# Patient Record
Sex: FEMALE | Race: WHITE | NOT HISPANIC OR LATINO | Employment: FULL TIME | URBAN - METROPOLITAN AREA
[De-identification: names, ages, dates, MRNs, and addresses within clinical notes are randomized per-mention and may not be internally consistent; named-entity substitution may affect disease eponyms.]

---

## 2018-06-28 ENCOUNTER — TELEPHONE (OUTPATIENT)
Dept: OBSTETRICS AND GYNECOLOGY | Age: 33
End: 2018-06-28

## 2018-08-15 ENCOUNTER — OFFICE VISIT (OUTPATIENT)
Dept: OBSTETRICS AND GYNECOLOGY | Age: 33
End: 2018-08-15

## 2018-08-15 VITALS
BODY MASS INDEX: 17.04 KG/M2 | SYSTOLIC BLOOD PRESSURE: 122 MMHG | HEIGHT: 62 IN | DIASTOLIC BLOOD PRESSURE: 70 MMHG | WEIGHT: 92.6 LBS

## 2018-08-15 DIAGNOSIS — R10.2 PELVIC PAIN: ICD-10-CM

## 2018-08-15 DIAGNOSIS — Z11.51 ENCOUNTER FOR SCREENING FOR HUMAN PAPILLOMAVIRUS (HPV): ICD-10-CM

## 2018-08-15 DIAGNOSIS — K62.89 RECTAL PAIN: ICD-10-CM

## 2018-08-15 DIAGNOSIS — Z01.419 VISIT FOR GYNECOLOGIC EXAMINATION: Primary | ICD-10-CM

## 2018-08-15 PROCEDURE — 99385 PREV VISIT NEW AGE 18-39: CPT | Performed by: OBSTETRICS & GYNECOLOGY

## 2018-08-15 NOTE — PROGRESS NOTES
Subjective     Chief Complaint   Patient presents with   • Gynecologic Exam       History of Present Illness    Milagros Espinal is a 32 y.o.  who presents for annual exam.  Pt presents to re-establish care. She was here in past and went to fertility from here. After several years, she was not successful with carrying pregnancy to term. She did adopt 3 years ago and has a daughter.   She continued at  as she did RE treatment there but now wants to re-establish care. She had Mirena placed 12/15. She has some intermittent pelvic pain and irregular bleeding mostly after sex every couple of months. She is unsure when her last pap was done. Her last visit here was . She also has rectal pain. She was evaluated at  and IUD was checked but they advised her it was in position.  Obstetric History:  OB History      Para Term  AB Living    2       2      SAB TAB Ectopic Molar Multiple Live Births    2                   Menstrual History:     No LMP recorded (lmp unknown).         Current contraception: IUD  History of abnormal Pap smear: no  Received Gardasil immunization: no  Perform regular self breast exam: no  Family history of uterine or ovarian cancer: no  Family History of colon cancer: no  Family history of breast cancer: no    Mammogram: not indicated.  Colonoscopy: recommended  DEXA: not indicated.    Exercise: moderately active  Calcium/Vitamin D: adequate intake    The following portions of the patient's history were reviewed and updated as appropriate: allergies, current medications, past family history, past medical history, past social history, past surgical history and problem list.    Review of Systems    Review of Systems   Constitutional: Negative for fatigue.   Respiratory: Negative for shortness of breath.    Gastrointestinal: Negative for abdominal pain. Positive for occ rectal pain, no blood in stool   Genitourinary: Negative for dysuria. positive for irregular bleeding and occ  "bleeding following intercourse, positive for pelvic pain  Neurological: Negative for headaches.   Psychiatric/Behavioral: Negative for dysphoric mood.         Objective   Physical Exam    /70   Ht 157.5 cm (62\")   Wt 42 kg (92 lb 9.6 oz)   LMP  (LMP Unknown)   BMI 16.94 kg/m²     General:   alert, appears stated age, cooperative and no distress   Neck: no asymmetry, masses, or scars and no adenopathy   Heart: regular rate and rhythm, S1, S2 normal, no murmur, click, rub or gallop   Lungs: clear to auscultation bilaterally   Abdomen: soft, non-tender, without masses or organomegaly   Breast: inspection negative, no nipple discharge or bleeding, no masses or nodularity palpable and no axillary adenopathy   Vulva: normal, Bartholin's, Urethra, Fosston's normal   Vagina: normal mucosa, normal discharge   Cervix: no lesions and IUD string noted   Uterus: normal size, mobile, non-tender   Adnexa: normal adnexa and no mass, fullness, tenderness   Rectal: not indicated     Assessment/Plan   Milagros was seen today for gynecologic exam.    Diagnoses and all orders for this visit:    Visit for gynecologic examination  -     IGP, Apt HPV,rfx 16 / 18,45    Encounter for screening for human papillomavirus (HPV)  -     IGP, Apt HPV,rfx 16 / 18,45    Pelvic pain    Rectal pain  -     Ambulatory Referral to Colorectal Surgery        All questions answered.  Breast self exam technique reviewed and patient encouraged to perform self-exam monthly.  Discussed healthy lifestyle modifications.  Recommended 30 minutes of aerobic exercise five times per week.    Exam does not show focal uterine or adnexal tenderness. Pt does not significant rectal pain in addition to symptoms, will refer to colorectal and schedule u/s at f/u 2 weeks                   "

## 2018-08-16 ENCOUNTER — TELEPHONE (OUTPATIENT)
Dept: OBSTETRICS AND GYNECOLOGY | Age: 33
End: 2018-08-16

## 2018-08-20 LAB
CYTOLOGIST CVX/VAG CYTO: NORMAL
CYTOLOGY CVX/VAG DOC THIN PREP: NORMAL
DX ICD CODE: NORMAL
HIV 1 & 2 AB SER-IMP: NORMAL
HPV I/H RISK 4 DNA CVX QL PROBE+SIG AMP: NEGATIVE
OTHER STN SPEC: NORMAL
PATH REPORT.FINAL DX SPEC: NORMAL
STAT OF ADQ CVX/VAG CYTO-IMP: NORMAL

## 2018-08-23 ENCOUNTER — TELEPHONE (OUTPATIENT)
Dept: OBSTETRICS AND GYNECOLOGY | Age: 33
End: 2018-08-23

## 2018-08-23 NOTE — TELEPHONE ENCOUNTER
----- Message from Rebecca Francis MD sent at 8/22/2018  6:40 PM EDT -----  Call pt, her pap and HPV are negative/normal

## 2018-08-30 ENCOUNTER — OFFICE VISIT (OUTPATIENT)
Dept: OBSTETRICS AND GYNECOLOGY | Age: 33
End: 2018-08-30

## 2018-08-30 ENCOUNTER — PROCEDURE VISIT (OUTPATIENT)
Dept: OBSTETRICS AND GYNECOLOGY | Age: 33
End: 2018-08-30

## 2018-08-30 VITALS
HEIGHT: 62 IN | BODY MASS INDEX: 17.11 KG/M2 | WEIGHT: 93 LBS | SYSTOLIC BLOOD PRESSURE: 100 MMHG | DIASTOLIC BLOOD PRESSURE: 68 MMHG

## 2018-08-30 DIAGNOSIS — R10.2 PELVIC PAIN: Primary | ICD-10-CM

## 2018-08-30 PROCEDURE — 99214 OFFICE O/P EST MOD 30 MIN: CPT | Performed by: OBSTETRICS & GYNECOLOGY

## 2018-08-30 PROCEDURE — 76830 TRANSVAGINAL US NON-OB: CPT | Performed by: OBSTETRICS & GYNECOLOGY

## 2018-08-30 NOTE — PROGRESS NOTES
"Chief complaint:pelvic pain    HPI  Milagros Espinal is a 32 y.o. female presents for ultrasound evaluation due to history of endometriosis and recent issues with intermittent pelvic pain. Patient also has pain with bowel movement and rectal pain. Patient also complains of intermittent bleeding that occasionally seems to occur following intercourse.         The following portions of the patient's history were reviewed and updated as appropriate: allergies, current medications, past family history, past medical history, past social history, past surgical history and problem list.    Review of Systems  Constitutional: negative  Gastrointestinal: positive for pain with BM and rectal pain  Genitourinary:positive for irregular bleeding and occ pelvic pain    /68   Ht 157.5 cm (62\")   Wt 42.2 kg (93 lb)   LMP  (LMP Unknown)   BMI 17.01 kg/m²         Physical Exam   Constitutional: She is oriented to person, place, and time. She appears well-developed and well-nourished.   Genitourinary:   Genitourinary Comments: No significant discomfort with pelvic ultrasound or imaging of uterus/ovaries/adnexa   Neurological: She is alert and oriented to person, place, and time.     tv u/s: normal uterus with IUD in appropriate position. Normal bilateral ovaries, trace fluid in bilateral adnexa, no adnexal masses      Milagros was seen today for follow-up.    Diagnoses and all orders for this visit:    Pelvic pain    reviewed normal ultrasound and symptoms. No source of pain noted. Pt does have endometriosis so could still have implants but would expect some management with IUD. Pain is rectal in nature for pt so will await opinion of colorectal surgeon at this point. Patient is also bothered by irregular nature of bleeding. Pap and HPV are negative/normal and no source noted so likely progestin-related. Advised she could change to combined pill but ongoing irregular bleeding could be noted given the usual need for continuous " pill dosing with endometriosis. Also discussed increased risks associated with estrogen in pill. Pt understands and desires to observe. Plan f/u visit 3 months.    Counseling was given to patient for the following topics: diagnostic results, instructions for management, impressions and risks and benefits of treatment options . Total time of the encounter was 25 minutes and 15 minutes was spend counseling.

## 2018-09-14 ENCOUNTER — OFFICE VISIT (OUTPATIENT)
Dept: SURGERY | Facility: CLINIC | Age: 33
End: 2018-09-14

## 2018-09-14 VITALS
HEART RATE: 78 BPM | HEIGHT: 62 IN | BODY MASS INDEX: 16.56 KG/M2 | RESPIRATION RATE: 19 BRPM | WEIGHT: 90 LBS | SYSTOLIC BLOOD PRESSURE: 100 MMHG | DIASTOLIC BLOOD PRESSURE: 60 MMHG | OXYGEN SATURATION: 98 % | TEMPERATURE: 98.3 F

## 2018-09-14 DIAGNOSIS — R10.2 PELVIC PAIN: Primary | ICD-10-CM

## 2018-09-14 PROCEDURE — 99243 OFF/OP CNSLTJ NEW/EST LOW 30: CPT | Performed by: COLON & RECTAL SURGERY

## 2018-09-14 RX ORDER — SODIUM CHLORIDE, SODIUM LACTATE, POTASSIUM CHLORIDE, CALCIUM CHLORIDE 600; 310; 30; 20 MG/100ML; MG/100ML; MG/100ML; MG/100ML
30 INJECTION, SOLUTION INTRAVENOUS CONTINUOUS
Status: CANCELLED | OUTPATIENT
Start: 2018-09-14

## 2018-09-14 NOTE — PROGRESS NOTES
Milagros Espinal is a 32 y.o. female who is seen as a consult at the request of Rebecca Francis MD for Rectal Pain and Pelvic Pain.      HPI:  Per pt  It is thought that she has endometrisos is growing on to colon  No rb  Once wk of vaginal bleeding - heavy  Talked about ocp  Dr. Fontenot did diag lap in 2013 and was told she had level 2 endometriosis  At [facility], went for gyn issue and told ovulating    No rectal pain  Feels like in the colon  bm reg      Past Medical History:   Diagnosis Date   • Endometriosis 2013   • Rectal bleeding    • Renal colic 08/10/2016    WITH URETEROLTHIASIS, SEEN AT [facility]       Past Surgical History:   Procedure Laterality Date   • CHOLECYSTECTOMY     • HYSTEROSCOPY N/A    • PELVIC LAPAROSCOPY N/A        Social History:   reports that she has never smoked. She has never used smokeless tobacco. She reports that she does not drink alcohol or use drugs.      Marriage status:     Family History   Problem Relation Age of Onset   • Diabetes Maternal Grandfather    • Hypertension Mother    • Breast cancer Neg Hx    • Ovarian cancer Neg Hx    • Uterine cancer Neg Hx    • Colon cancer Neg Hx        No current facility-administered medications for this visit.   No current outpatient prescriptions on file.    Facility-Administered Medications Ordered in Other Visits:   •  lactated ringers infusion, 30 mL/hr, Intravenous, Continuous, Alta Scott MD, Last Rate: 30 mL/hr at 09/19/18 1018, 30 mL/hr at 09/19/18 1018    Allergy  Patient has no known allergies.    Review of Systems   All other systems reviewed and are negative.      Vitals:    09/14/18 1038   BP: 100/60   Pulse: 78   Resp: 19   Temp: 98.3 °F (36.8 °C)   SpO2: 98%     Body mass index is 16.46 kg/m².    Physical Exam   Constitutional: She is oriented to person, place, and time. She appears well-developed and well-nourished. No distress.   HENT:   Head: Normocephalic and atraumatic.   Nose: Nose normal.   Mouth/Throat:  Oropharynx is clear and moist.   Eyes: Pupils are equal, round, and reactive to light. Conjunctivae and EOM are normal.   Neck: Normal range of motion. No tracheal deviation present.   Pulmonary/Chest: Effort normal and breath sounds normal. No respiratory distress.   Abdominal: Soft. Bowel sounds are normal. She exhibits no distension.   Musculoskeletal: Normal range of motion. She exhibits no edema or deformity.   Neurological: She is alert and oriented to person, place, and time. No cranial nerve deficit. Coordination and gait normal.   Skin: Skin is warm and dry.   Psychiatric: She has a normal mood and affect. Her behavior is normal. Judgment normal.       Review of Medical Record:  I reviewed  office note     Assessment:  1. Pelvic pain        Plan:    I recommend cy. I discussed with patient risks, benefits, and alternatives.  The patient had opportunity to ask questions.  I answered all questions.  Patient understands and wishes to proceed with procedure.

## 2018-09-19 ENCOUNTER — HOSPITAL ENCOUNTER (OUTPATIENT)
Facility: HOSPITAL | Age: 33
Setting detail: HOSPITAL OUTPATIENT SURGERY
Discharge: HOME | End: 2018-09-19
Attending: COLON & RECTAL SURGERY | Admitting: COLON & RECTAL SURGERY

## 2018-09-19 ENCOUNTER — ANESTHESIA (OUTPATIENT)
Dept: GASTROENTEROLOGY | Facility: HOSPITAL | Age: 33
End: 2018-09-19

## 2018-09-19 ENCOUNTER — ANESTHESIA EVENT (OUTPATIENT)
Dept: GASTROENTEROLOGY | Facility: HOSPITAL | Age: 33
End: 2018-09-19

## 2018-09-19 VITALS
WEIGHT: 91.5 LBS | HEART RATE: 78 BPM | SYSTOLIC BLOOD PRESSURE: 116 MMHG | RESPIRATION RATE: 15 BRPM | TEMPERATURE: 98.1 F | HEIGHT: 62 IN | DIASTOLIC BLOOD PRESSURE: 77 MMHG | OXYGEN SATURATION: 100 % | BODY MASS INDEX: 16.84 KG/M2

## 2018-09-19 DIAGNOSIS — R10.2 PELVIC PAIN: ICD-10-CM

## 2018-09-19 LAB
B-HCG UR QL: NEGATIVE
INTERNAL NEGATIVE CONTROL: NEGATIVE
INTERNAL POSITIVE CONTROL: POSITIVE
Lab: NORMAL

## 2018-09-19 PROCEDURE — 81025 URINE PREGNANCY TEST: CPT | Performed by: COLON & RECTAL SURGERY

## 2018-09-19 PROCEDURE — 25010000002 PROPOFOL 10 MG/ML EMULSION: Performed by: ANESTHESIOLOGY

## 2018-09-19 PROCEDURE — 45378 DIAGNOSTIC COLONOSCOPY: CPT | Performed by: COLON & RECTAL SURGERY

## 2018-09-19 RX ORDER — LIDOCAINE HYDROCHLORIDE 20 MG/ML
INJECTION, SOLUTION INFILTRATION; PERINEURAL AS NEEDED
Status: DISCONTINUED | OUTPATIENT
Start: 2018-09-19 | End: 2018-09-19 | Stop reason: SURG

## 2018-09-19 RX ORDER — PROPOFOL 10 MG/ML
VIAL (ML) INTRAVENOUS AS NEEDED
Status: DISCONTINUED | OUTPATIENT
Start: 2018-09-19 | End: 2018-09-19 | Stop reason: SURG

## 2018-09-19 RX ORDER — SODIUM CHLORIDE, SODIUM LACTATE, POTASSIUM CHLORIDE, CALCIUM CHLORIDE 600; 310; 30; 20 MG/100ML; MG/100ML; MG/100ML; MG/100ML
30 INJECTION, SOLUTION INTRAVENOUS CONTINUOUS
Status: DISCONTINUED | OUTPATIENT
Start: 2018-09-19 | End: 2018-09-19 | Stop reason: HOSPADM

## 2018-09-19 RX ADMIN — SODIUM CHLORIDE, POTASSIUM CHLORIDE, SODIUM LACTATE AND CALCIUM CHLORIDE 30 ML/HR: 600; 310; 30; 20 INJECTION, SOLUTION INTRAVENOUS at 10:18

## 2018-09-19 RX ADMIN — LIDOCAINE HYDROCHLORIDE 75 MG: 20 INJECTION, SOLUTION INFILTRATION; PERINEURAL at 10:35

## 2018-09-19 RX ADMIN — PROPOFOL 300 MG: 10 INJECTION, EMULSION INTRAVENOUS at 10:35

## 2018-09-19 RX ADMIN — SODIUM CHLORIDE, POTASSIUM CHLORIDE, SODIUM LACTATE AND CALCIUM CHLORIDE: 600; 310; 30; 20 INJECTION, SOLUTION INTRAVENOUS at 10:35

## 2018-09-19 NOTE — ANESTHESIA POSTPROCEDURE EVALUATION
"Patient: Milagros Espinal    Procedure Summary     Date:  09/19/18 Room / Location:   [facility]    Anesthesia Start:  1036 Anesthesia Stop:  1057    Procedure:  COLONOSCOPY to cecum (N/A ) Diagnosis:       Pelvic pain      (Pelvic pain [R10.2])    Surgeon:  Alta Scott MD Provider:  Haim Baxter MD    Anesthesia Type:  MAC ASA Status:  2          Anesthesia Type: MAC  Last vitals  BP   116/85 (09/19/18 1007)   Temp   36.7 °C (98.1 °F) (09/19/18 1007)   Pulse   78 (09/19/18 1007)   Resp   16 (09/19/18 1007)     SpO2   100 % (09/19/18 1007)     Post Anesthesia Care and Evaluation    Patient location during evaluation: bedside  Patient participation: complete - patient participated  Level of consciousness: awake and alert  Pain management: adequate  Airway patency: patent  Anesthetic complications: No anesthetic complications    Cardiovascular status: acceptable  Respiratory status: acceptable  Hydration status: acceptable    Comments: /85 (BP Location: Left arm, Patient Position: Lying)   Pulse 78   Temp 36.7 °C (98.1 °F) (Oral)   Resp 16   Ht 157.5 cm (62\")   Wt 41.5 kg (91 lb 8 oz)   SpO2 100%   BMI 16.74 kg/m²       "

## 2018-09-19 NOTE — ANESTHESIA PREPROCEDURE EVALUATION
Anesthesia Evaluation     Patient summary reviewed and Nursing notes reviewed   NPO Solid Status: > 8 hours  NPO Liquid Status: > 4 hours           Airway   Mallampati: II  TM distance: >3 FB  Neck ROM: full  no difficulty expected  Dental - normal exam     Pulmonary - negative pulmonary ROS and normal exam   Cardiovascular - negative cardio ROS and normal exam        Neuro/Psych- negative ROS  GI/Hepatic/Renal/Endo    (+)  GI bleeding,     Musculoskeletal (-) negative ROS    Abdominal  - normal exam   Substance History - negative use     OB/GYN          Other - negative ROS                       Anesthesia Plan    ASA 2     MAC     Anesthetic plan, all risks, benefits, and alternatives have been provided, discussed and informed consent has been obtained with: patient.

## 2018-09-19 NOTE — H&P (VIEW-ONLY)
Milagros Espinal is a 32 y.o. female who is seen as a consult at the request of Rebecca Francis MD for Rectal Pain and Pelvic Pain.      HPI:  Per pt  It is thought that she has endometrisos is growing on to colon  No rb  Once wk of vaginal bleeding - heavy  Talked about ocp  Dr. Fontenot did diag lap in 2013 and was told she had level 2 endometriosis  At [facility], went for gyn issue and told ovulating    No rectal pain  Feels like in the colon  bm reg      Past Medical History:   Diagnosis Date   • Endometriosis 2013   • Rectal bleeding    • Renal colic 08/10/2016    WITH URETEROLTHIASIS, SEEN AT [facility]       Past Surgical History:   Procedure Laterality Date   • CHOLECYSTECTOMY     • HYSTEROSCOPY N/A    • PELVIC LAPAROSCOPY N/A        Social History:   reports that she has never smoked. She has never used smokeless tobacco. She reports that she does not drink alcohol or use drugs.      Marriage status:     Family History   Problem Relation Age of Onset   • Diabetes Maternal Grandfather    • Hypertension Mother    • Breast cancer Neg Hx    • Ovarian cancer Neg Hx    • Uterine cancer Neg Hx    • Colon cancer Neg Hx        No current facility-administered medications for this visit.   No current outpatient prescriptions on file.    Facility-Administered Medications Ordered in Other Visits:   •  lactated ringers infusion, 30 mL/hr, Intravenous, Continuous, Alta Scott MD, Last Rate: 30 mL/hr at 09/19/18 1018, 30 mL/hr at 09/19/18 1018    Allergy  Patient has no known allergies.    Review of Systems   All other systems reviewed and are negative.      Vitals:    09/14/18 1038   BP: 100/60   Pulse: 78   Resp: 19   Temp: 98.3 °F (36.8 °C)   SpO2: 98%     Body mass index is 16.46 kg/m².    Physical Exam   Constitutional: She is oriented to person, place, and time. She appears well-developed and well-nourished. No distress.   HENT:   Head: Normocephalic and atraumatic.   Nose: Nose normal.   Mouth/Throat:  Oropharynx is clear and moist.   Eyes: Pupils are equal, round, and reactive to light. Conjunctivae and EOM are normal.   Neck: Normal range of motion. No tracheal deviation present.   Pulmonary/Chest: Effort normal and breath sounds normal. No respiratory distress.   Abdominal: Soft. Bowel sounds are normal. She exhibits no distension.   Musculoskeletal: Normal range of motion. She exhibits no edema or deformity.   Neurological: She is alert and oriented to person, place, and time. No cranial nerve deficit. Coordination and gait normal.   Skin: Skin is warm and dry.   Psychiatric: She has a normal mood and affect. Her behavior is normal. Judgment normal.       Review of Medical Record:  I reviewed  office note     Assessment:  1. Pelvic pain        Plan:    I recommend cy. I discussed with patient risks, benefits, and alternatives.  The patient had opportunity to ask questions.  I answered all questions.  Patient understands and wishes to proceed with procedure.

## 2018-11-05 ENCOUNTER — OFFICE VISIT (OUTPATIENT)
Dept: OBSTETRICS AND GYNECOLOGY | Age: 33
End: 2018-11-05

## 2018-11-05 VITALS
WEIGHT: 92.8 LBS | BODY MASS INDEX: 17.08 KG/M2 | DIASTOLIC BLOOD PRESSURE: 60 MMHG | SYSTOLIC BLOOD PRESSURE: 100 MMHG | HEIGHT: 62 IN

## 2018-11-05 DIAGNOSIS — N80.9 ENDOMETRIOSIS: ICD-10-CM

## 2018-11-05 DIAGNOSIS — R10.2 PELVIC PAIN: Primary | ICD-10-CM

## 2018-11-05 PROCEDURE — 99213 OFFICE O/P EST LOW 20 MIN: CPT | Performed by: OBSTETRICS & GYNECOLOGY

## 2018-11-05 PROCEDURE — 58301 REMOVE INTRAUTERINE DEVICE: CPT | Performed by: OBSTETRICS & GYNECOLOGY

## 2018-11-05 NOTE — PROGRESS NOTES
"Chief complaint: pelvic pain    HPI  Milagros Espinal is a 32 y.o. female presents for f/u of pelvic pain. She denies any recurrence of pain since last evaluation. She has not had any bleeding since last evaluation. She has not been sexually active recently either. She had colonoscopy and it was negative. Patient has considered and desires to have IUD removed and change back to ocp's for control of endometriosis symptoms. OCP's worked in past and she did not have bleeding associated with intercourse. She notes that bleeding for a few days following intercourse has only been noted with IUD in place.         The following portions of the patient's history were reviewed and updated as appropriate: allergies, current medications, past family history, past medical history, past social history, past surgical history and problem list.    Review of Systems  Constitutional: negative  Gastrointestinal: negative for rectal pain  Genitourinary:negative for vaginal bleeding  Neurological: negative for migraine headaches    /60   Ht 157.5 cm (62\")   Wt 42.1 kg (92 lb 12.8 oz)   BMI 16.97 kg/m²         Physical Exam   Constitutional: She appears well-developed and well-nourished.   Cardiovascular: Normal rate and regular rhythm.    Pulmonary/Chest: Effort normal.   Abdominal: Soft. There is no tenderness. There is no guarding.   Genitourinary:   Genitourinary Comments: Normal vagina, normal discharge, cervix without lesions, IUD string noted and grasped with ring forceps and removed without difficulty.No CMT, no adnexal masses or tenderness   Skin: Skin is warm and dry.   Psychiatric: She has a normal mood and affect. Her behavior is normal.           Milagros was seen today for follow-up.    Diagnoses and all orders for this visit:    Pelvic pain  -     Norethin-Eth Estrad-Fe Biphas (LO LOESTRIN FE) 1 MG-10 MCG / 10 MCG tablet; Take 1 tablet by mouth Daily.    Endometriosis  -     Norethin-Eth Estrad-Fe Biphas (LO LOESTRIN " FE) 1 MG-10 MCG / 10 MCG tablet; Take 1 tablet by mouth Daily.    Pt understands risks of combined ocp use to include DVT/PE/CVE/HTN/gallbladder disease and breast cancer. She wants lowest dose of ocp's available. Advised pt to call if bleeding following intercourse recurs with IUD removed and will proceed with colposcopy. As her pap and hpv are negative, if bleeding resolves with IUD removed, will defer colpo and pt agrees.    IUD Removal Procedure Note    Type of IUD:  Mirena  Date of insertion:  unknown  Reason for removal:  Side effect: bleeding after intercourse  Other relevant history/information:  none    Procedure Time Out Documentation      Procedure Details  IUD strings visible:  yes  Local anesthesia:  None  Tenaculum used:  None  Removal:  IUD strings grasped and IUD removed intact with gentle traction.  The patient tolerated the procedure well.    All appropriate instructions regarding removal were reviewed.    Patient tolerated the procedure well without complications.    Plans for contraception:  oral contraceptives (estrogen/progesterone)    Other follow-up needed:  none    The patient was advised to call for any fever or for prolonged or severe pain or bleeding.

## (undated) DEVICE — TUBING, SUCTION, 1/4" X 10', STRAIGHT: Brand: MEDLINE

## (undated) DEVICE — CANN NASL CO2 TRULINK W/O2 A/

## (undated) DEVICE — Device: Brand: DEFENDO AIR/WATER/SUCTION AND BIOPSY VALVE

## (undated) DEVICE — THE TORRENT IRRIGATION SCOPE CONNECTOR IS USED WITH THE TORRENT IRRIGATION TUBING TO PROVIDE IRRIGATION FLUIDS SUCH AS STERILE WATER DURING GASTROINTESTINAL ENDOSCOPIC PROCEDURES WHEN USED IN CONJUNCTION WITH AN IRRIGATION PUMP (OR ELECTROSURGICAL UNIT).: Brand: TORRENT